# Patient Record
Sex: FEMALE | Race: WHITE | NOT HISPANIC OR LATINO | URBAN - METROPOLITAN AREA
[De-identification: names, ages, dates, MRNs, and addresses within clinical notes are randomized per-mention and may not be internally consistent; named-entity substitution may affect disease eponyms.]

---

## 2020-02-28 ENCOUNTER — EMERGENCY (EMERGENCY)
Facility: HOSPITAL | Age: 45
LOS: 1 days | Discharge: HOME | End: 2020-02-28
Admitting: EMERGENCY MEDICINE
Payer: MEDICAID

## 2020-02-28 VITALS
TEMPERATURE: 98 F | SYSTOLIC BLOOD PRESSURE: 118 MMHG | DIASTOLIC BLOOD PRESSURE: 82 MMHG | OXYGEN SATURATION: 96 % | WEIGHT: 160.06 LBS | HEART RATE: 90 BPM | HEIGHT: 66 IN | RESPIRATION RATE: 18 BRPM

## 2020-02-28 DIAGNOSIS — R23.4 CHANGES IN SKIN TEXTURE: ICD-10-CM

## 2020-02-28 DIAGNOSIS — Z88.5 ALLERGY STATUS TO NARCOTIC AGENT: ICD-10-CM
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
## 2020-02-28 DIAGNOSIS — L53.9 ERYTHEMATOUS CONDITION, UNSPECIFIED: ICD-10-CM

## 2020-02-28 DIAGNOSIS — S81.802D UNSPECIFIED OPEN WOUND, LEFT LOWER LEG, SUBSEQUENT ENCOUNTER: ICD-10-CM

## 2020-02-28 PROCEDURE — 99283 EMERGENCY DEPT VISIT LOW MDM: CPT

## 2020-02-28 RX ADMIN — Medication 300 MILLIGRAM(S): at 22:56

## 2020-02-28 NOTE — ED ADULT TRIAGE NOTE - CHIEF COMPLAINT QUOTE
with non healing open wound to my left leg (anterior) going on for a month , now its getting redder and more swollen

## 2020-02-28 NOTE — ED PROVIDER NOTE - SKIN, MLM
Skin normal color for race, warm, dry and intact. No evidence of rash.  small superficial wound to left mid shin, no fluctuance, no drainage,

## 2020-02-28 NOTE — ED ADULT TRIAGE NOTE - PAIN RATING/NUMBER SCALE (0-10): REST
Fatou Worley discharged to home accompanied by  and son.   Patient provided with the following educational materials upon discharge:  Hypokalemia.  Valuables and belongings sent with patient.   discharge summary, discharge instructions, medications and follow up appointments reviewed with patient and  and son.  Patient and  and son verbalized understanding.  
5

## 2020-02-28 NOTE — ED PROVIDER NOTE - NSFOLLOWUPINSTRUCTIONS_ED_ALL_ED_FT
I have discussed the discharge plan with the patient. The patient agrees with the plan, as discussed.  The patient understands Emergency Department diagnosis is a preliminary diagnosis often based on limited information and that the patient must adhere to the follow-up plan as discussed.  The patient understands that if the symptoms worsen or if prescribed medications do not have the desired/planned effect that the patient may return to the Emergency Department at any time for further evaluation and treatment.      Wound Infection  A wound infection happens when germs start to grow in a wound. Germs that cause wound infections are most often bacteria. Other types of infections can occur as well. An infection can cause the wound to break open. Wound infections need treatment. If a wound infection is not treated, problems can happen.  What are the causes?  Most often caused by germs (bacteria) that grow in a wound.Other germs, such as yeast and funguses, can also cause wound infections.What increases the risk?  Having a weak body defense system (immune system).Having diabetes.Taking certain medicines (steroids) for a long time.Smoking.Being an older person.Being overweight.Taking certain medicines for cancer treatment.What are the signs or symptoms?  Having more redness, swelling, or pain at the wound site.Having more blood or fluid at the wound site.A bad smell coming from a wound or bandage (dressing).Having a fever.Feeling very tired.Having warmth at or around the wound.Having pus at the wound site.How is this treated?  This condition is most often treated with an antibiotic medicine.  The infection should improve 24–48 hours after you start antibiotics.After 24–48 hours, redness around the wound should stop spreading. The wound should also be less painful.Follow these instructions at home:  Medicines     Take or apply over-the-counter and prescription medicines only as told by your doctor.If you were prescribed an antibiotic medicine, take or apply it as told by your doctor. Do not stop using the antibiotic even if you start to feel better.Wound care        Clean the wound each day, or as told by your doctor.  Wash the wound with mild soap and water.Rinse the wound with water to remove all soap.Pat the wound dry with a clean towel. Do not rub it.Follow instructions from your doctor about how to take care of your wound. Make sure you:  Wash your hands with soap and water before and after you change your bandage. If you cannot use soap and water, use hand .Change your bandage as told by your doctor.Leave stitches (sutures), skin glue, or skin tape (adhesive) strips in place if your wound has been closed. They may need to stay in place for 2 weeks or longer. If tape strips get loose and curl up, you may trim the loose edges. Do not remove tape strips completely unless your doctor says it is okay. Some wounds are left open to heal on their own.Check your wound every day for signs of infection. Watch for:  More redness, swelling, or pain.More fluid or blood.Warmth.Pus or a bad smell.General instructions     Keep the bandage dry until your doctor says it can be removed.Do not take baths, swim, or use a hot tub until your doctor approves. Ask your doctor if you may take showers. You may only be allowed to take sponge baths.Raise (elevate) the injured area above the level of your heart while you are sitting or lying down.Do not scratch or pick at the wound.Keep all follow-up visits as told by your doctor. This is important.Contact a doctor if:  Medicine does not help your pain.You have more redness, swelling, or pain around your wound.You have more fluid or blood coming from your wound.Your wound feels warm to the touch.You have pus coming from your wound.You notice a bad smell coming from your wound or your bandage.Your wound that was closed breaks open.Get help right away if:  You have a red streak going away from your wound.You have a fever.Summary  A wound infection happens when germs start to grow in a wound.This condition is usually treated with an antibiotic medicine.Follow instructions from your doctor about how to take care of your wound.Contact a doctor if your wound infection does not start to get better in 24–48 hours, or your symptoms get worse.Keep all follow-up visits as told by your doctor. This is important.This information is not intended to replace advice given to you by your health care provider. Make sure you discuss any questions you have with your health care provider.

## 2020-02-28 NOTE — ED PROVIDER NOTE - OBJECTIVE STATEMENT
43 yo female, generally healthy and w/o any significant PMH,  in the ER for a wound check. Pt has small size wound on her left  anterior mid shin. pt noted small blister on her leg about 1 month ago, blister has opened up and never healed. pt states she had scab covering wound, but it fall over now she noted redness surrounding wound.

## 2020-02-28 NOTE — ED PROVIDER NOTE - CLINICAL SUMMARY MEDICAL DECISION MAKING FREE TEXT BOX
generally healthy 43 yo female in the ER for a wound check. Pt has small size of a dime wound to anterior mid shin that has not been completely healed x 1 month. Pt reports it started as blister that opened up. Mild erythema surrounding, no active drainage from the wound, no fluctuance, no streaking. Good distal pulses and minimal edema to ankle on exam. will start Po abx considering that its been over 1 month and recommended to f/u for wound check in 48 hours. Pt agrees with a treatment plan.

## 2020-02-28 NOTE — ED PROVIDER NOTE - PATIENT PORTAL LINK FT
You can access the FollowMyHealth Patient Portal offered by [facility] by registering at the following website: [email or web address]. By joining [facility]’s FollowMyHealth portal, you will also be able to view your health information using other applications (apps) compatible with our system.

## 2020-02-28 NOTE — ED ADULT NURSE NOTE - NSIMPLEMENTINTERV_GEN_ALL_ED
Implemented All Universal Safety Interventions:  [place] to call system. Call bell, personal items and telephone within reach. Instruct patient to call for assistance. Room bathroom lighting operational. Non-slip footwear when patient is off stretcher. Physically safe environment: no spills, clutter or unnecessary equipment. Stretcher in lowest position, wheels locked, appropriate side rails in place.

## 2022-06-02 NOTE — ED ADULT NURSE NOTE - NSFALLRSKUNASSIST_ED_ALL_ED
Fax sent requesting records.  
Previous Provider: Eun Miranda  Previous Healthcare Organization: [facility]  City/State: [place], [place]  Phone (if available): [phone]  Fax (if available): [phone]  Date of New Patient Appointment: 7/22/22  [facility] provider: Dr. Rodriguez  
no